# Patient Record
Sex: FEMALE | Race: WHITE | Employment: UNEMPLOYED | URBAN - METROPOLITAN AREA
[De-identification: names, ages, dates, MRNs, and addresses within clinical notes are randomized per-mention and may not be internally consistent; named-entity substitution may affect disease eponyms.]

---

## 2022-01-29 ENCOUNTER — APPOINTMENT (OUTPATIENT)
Dept: GENERAL RADIOLOGY | Age: 67
End: 2022-01-29
Attending: EMERGENCY MEDICINE
Payer: COMMERCIAL

## 2022-01-29 ENCOUNTER — HOSPITAL ENCOUNTER (EMERGENCY)
Age: 67
Discharge: HOME | End: 2022-01-29
Attending: EMERGENCY MEDICINE
Payer: COMMERCIAL

## 2022-01-29 VITALS
HEART RATE: 75 BPM | TEMPERATURE: 98.4 F | SYSTOLIC BLOOD PRESSURE: 131 MMHG | RESPIRATION RATE: 16 BRPM | DIASTOLIC BLOOD PRESSURE: 86 MMHG | OXYGEN SATURATION: 97 % | WEIGHT: 130.07 LBS

## 2022-01-29 DIAGNOSIS — K29.00 ACUTE GASTRITIS WITHOUT HEMORRHAGE, UNSPECIFIED GASTRITIS TYPE: ICD-10-CM

## 2022-01-29 DIAGNOSIS — F43.21 GRIEF REACTION: ICD-10-CM

## 2022-01-29 DIAGNOSIS — R07.9 CHEST PAIN, UNSPECIFIED TYPE: Primary | ICD-10-CM

## 2022-01-29 DIAGNOSIS — E87.6 HYPOKALEMIA: ICD-10-CM

## 2022-01-29 LAB
ALBUMIN SERPL-MCNC: 4.4 G/DL (ref 3.5–5)
ALBUMIN/GLOB SERPL: 1.3 {RATIO} (ref 1.1–2.2)
ALP SERPL-CCNC: 54 U/L (ref 45–117)
ALT SERPL-CCNC: 21 U/L (ref 12–78)
ANION GAP SERPL CALC-SCNC: 13 MMOL/L (ref 5–15)
AST SERPL-CCNC: 17 U/L (ref 15–37)
BASOPHILS # BLD: 0 K/UL (ref 0–0.1)
BASOPHILS NFR BLD: 1 % (ref 0–1)
BILIRUB SERPL-MCNC: 0.6 MG/DL (ref 0.2–1)
BUN SERPL-MCNC: 17 MG/DL (ref 6–20)
BUN/CREAT SERPL: 18 (ref 12–20)
CALCIUM SERPL-MCNC: 9.5 MG/DL (ref 8.5–10.1)
CHLORIDE SERPL-SCNC: 100 MMOL/L (ref 97–108)
CO2 SERPL-SCNC: 25 MMOL/L (ref 21–32)
CREAT SERPL-MCNC: 0.93 MG/DL (ref 0.55–1.02)
DIFFERENTIAL METHOD BLD: NORMAL
EOSINOPHIL # BLD: 0.1 K/UL (ref 0–0.4)
EOSINOPHIL NFR BLD: 1 % (ref 0–7)
ERYTHROCYTE [DISTWIDTH] IN BLOOD BY AUTOMATED COUNT: 11.9 % (ref 11.5–14.5)
GLOBULIN SER CALC-MCNC: 3.4 G/DL (ref 2–4)
GLUCOSE SERPL-MCNC: 122 MG/DL (ref 65–100)
HCT VFR BLD AUTO: 37.6 % (ref 35–47)
HGB BLD-MCNC: 13.2 G/DL (ref 11.5–16)
IMM GRANULOCYTES # BLD AUTO: 0 K/UL (ref 0–0.04)
IMM GRANULOCYTES NFR BLD AUTO: 0 % (ref 0–0.5)
LYMPHOCYTES # BLD: 1.9 K/UL (ref 0.8–3.5)
LYMPHOCYTES NFR BLD: 30 % (ref 12–49)
MCH RBC QN AUTO: 31 PG (ref 26–34)
MCHC RBC AUTO-ENTMCNC: 35.1 G/DL (ref 30–36.5)
MCV RBC AUTO: 88.3 FL (ref 80–99)
MONOCYTES # BLD: 0.5 K/UL (ref 0–1)
MONOCYTES NFR BLD: 8 % (ref 5–13)
NEUTS SEG # BLD: 3.8 K/UL (ref 1.8–8)
NEUTS SEG NFR BLD: 60 % (ref 32–75)
NRBC # BLD: 0 K/UL (ref 0–0.01)
NRBC BLD-RTO: 0 PER 100 WBC
PLATELET # BLD AUTO: 293 K/UL (ref 150–400)
PMV BLD AUTO: 9.8 FL (ref 8.9–12.9)
POTASSIUM SERPL-SCNC: 3.4 MMOL/L (ref 3.5–5.1)
PROT SERPL-MCNC: 7.8 G/DL (ref 6.4–8.2)
RBC # BLD AUTO: 4.26 M/UL (ref 3.8–5.2)
SODIUM SERPL-SCNC: 138 MMOL/L (ref 136–145)
TROPONIN-HIGH SENSITIVITY: 19 NG/L (ref 0–51)
TROPONIN-HIGH SENSITIVITY: 6 NG/L (ref 0–51)
WBC # BLD AUTO: 6.2 K/UL (ref 3.6–11)

## 2022-01-29 PROCEDURE — 84484 ASSAY OF TROPONIN QUANT: CPT

## 2022-01-29 PROCEDURE — 71046 X-RAY EXAM CHEST 2 VIEWS: CPT

## 2022-01-29 PROCEDURE — 74011250637 HC RX REV CODE- 250/637: Performed by: EMERGENCY MEDICINE

## 2022-01-29 PROCEDURE — 36415 COLL VENOUS BLD VENIPUNCTURE: CPT

## 2022-01-29 PROCEDURE — 93005 ELECTROCARDIOGRAM TRACING: CPT

## 2022-01-29 PROCEDURE — 85025 COMPLETE CBC W/AUTO DIFF WBC: CPT

## 2022-01-29 PROCEDURE — 80053 COMPREHEN METABOLIC PANEL: CPT

## 2022-01-29 PROCEDURE — 99285 EMERGENCY DEPT VISIT HI MDM: CPT

## 2022-01-29 RX ORDER — OMEPRAZOLE 40 MG/1
40 CAPSULE, DELAYED RELEASE ORAL DAILY
Qty: 40 CAPSULE | Refills: 0 | Status: SHIPPED | OUTPATIENT
Start: 2022-01-29

## 2022-01-29 RX ORDER — ASPIRIN 81 MG/1
81 TABLET ORAL DAILY
Qty: 30 TABLET | Refills: 0 | Status: SHIPPED | OUTPATIENT
Start: 2022-01-29

## 2022-01-29 RX ORDER — GUAIFENESIN 100 MG/5ML
162 LIQUID (ML) ORAL
Status: COMPLETED | OUTPATIENT
Start: 2022-01-29 | End: 2022-01-29

## 2022-01-29 RX ADMIN — ASPIRIN 81 MG CHEWABLE TABLET 162 MG: 81 TABLET CHEWABLE at 20:20

## 2022-01-29 NOTE — ED TRIAGE NOTES
Pt arrives with CP for a few days. Denies SOB. Some nausea. Pt has been under a lot of stress after recently as he lost her  last month. Pt took xanax and benadryl approx 5 hours ago and was able to sleep for a few hours.

## 2022-01-30 LAB
ATRIAL RATE: 82 BPM
CALCULATED P AXIS, ECG09: 52 DEGREES
CALCULATED R AXIS, ECG10: -57 DEGREES
CALCULATED T AXIS, ECG11: 57 DEGREES
DIAGNOSIS, 93000: NORMAL
P-R INTERVAL, ECG05: 160 MS
Q-T INTERVAL, ECG07: 376 MS
QRS DURATION, ECG06: 104 MS
QTC CALCULATION (BEZET), ECG08: 439 MS
VENTRICULAR RATE, ECG03: 82 BPM

## 2022-01-30 NOTE — DISCHARGE INSTRUCTIONS
Please follow closely with cardiology and your primary care doctor as discussed and return to the emergency department for any new or worsening symptoms. No strenuous activity until released by cardiology.

## 2022-01-30 NOTE — ED PROVIDER NOTES
Patient is a 70-year-old female with past medical history significant for melanoma and hiatal hernia who presents emergency department for evaluation of chest pain. She states she is actually been having intermittent episodes of chest pain for the past several weeks and saw her primary care doctor just over a week ago for the same. She states her cardiologist Dr. Bettie Coker is supposed to be setting her up for an echo stress test sometime soon however she is not sure its been set up yet. She states her primary care doctor was supposed to be looking into helping facilitate this as well. Patient denies any prior history of coronary artery disease. Denies any history of hypertension. Non-smoker. She states she has been under an immense amount of stress lately which she feels is likely playing a part in her symptoms. She states she found her  when he  unexpectedly about a month ago. She states there has not been a clear association with exertion and symptoms tend to start randomly. She also feels that her gastritis may be getting worse. She does take Pepcid at baseline. She states earlier today she felt anxiety was getting worse and so she took some Benadryl and a small dose of Xanax and took a long nap. She states when she woke up she started to have some chest discomfort which was about an hour and 1/2 to 2 hours prior to arrival to the ER. But symptoms are better at this time. Denies any peripheral swelling or calf pain. No history of PE or DVT. No syncope           Past Medical History:   Diagnosis Date    Melanoma [facility])        Past Surgical History:   Procedure Laterality Date    HX HEENT      dental implant         History reviewed. No pertinent family history.     Social History     Socioeconomic History    Marital status: SINGLE     Spouse name: Not on file    Number of children: Not on file    Years of education: Not on file    Highest education level: Not on file   Occupational History    Not on file   Tobacco Use    Smoking status: Never Smoker    Smokeless tobacco: Never Used   Substance and Sexual Activity    Alcohol use: Never    Drug use: Not on file    Sexual activity: Not on file   Other Topics Concern    Not on file   Social History Narrative    Not on file     Social Determinants of Health     Financial Resource Strain:     Difficulty of Paying Living Expenses: Not on file   Food Insecurity:     Worried About Running Out of Food in the Last Year: Not on file    Sherri of Food in the Last Year: Not on file   Transportation Needs:     Lack of Transportation (Medical): Not on file    Lack of Transportation (Non-Medical): Not on file   Physical Activity:     Days of Exercise per Week: Not on file    Minutes of Exercise per Session: Not on file   Stress:     Feeling of Stress : Not on file   Social Connections:     Frequency of Communication with Friends and Family: Not on file    Frequency of Social Gatherings with Friends and Family: Not on file    Attends Muslim Services: Not on file    Active Member of [address] [place] [address] or Organizations: Not on file    Attends Club or Organization Meetings: Not on file    Marital Status: Not on file   Intimate Partner Violence:     Fear of Current or Ex-Partner: Not on file    Emotionally Abused: Not on file    Physically Abused: Not on file    Sexually Abused: Not on file   Housing Stability:     Unable to Pay for Housing in the Last Year: Not on file    Number of [place] in the Last Year: Not on file    Unstable Housing in the Last Year: Not on file         ALLERGIES: Azithromycin    Review of Systems   Constitutional: Negative for fever. HENT: Negative for drooling. Respiratory: Negative for shortness of breath. Cardiovascular: Positive for chest pain. Negative for leg swelling. Gastrointestinal: Negative for blood in stool and vomiting. Musculoskeletal: Negative for neck pain. Skin: Negative for rash. Neurological: Negative for seizures and syncope. Hematological: Does not bruise/bleed easily. Psychiatric/Behavioral: Positive for sleep disturbance. Negative for confusion. Vitals:    01/29/22 2008 01/29/22 2023 01/29/22 2038 01/29/22 2053   BP: (!) 141/82 (!) 142/88 (!) 128/92 131/86   Pulse:       Resp:       Temp:       SpO2: 98% 97% 98% 97%   Weight:                Physical Exam  Vitals and nursing note reviewed. HENT:      Head: Normocephalic and atraumatic. Neck:      Trachea: No tracheal deviation. Cardiovascular:      Rate and Rhythm: Normal rate. Heart sounds: No murmur heard. No friction rub. Pulmonary:      Effort: Pulmonary effort is normal.      Breath sounds: Normal breath sounds. Chest:      Chest wall: No deformity or crepitus. Musculoskeletal:      Cervical back: Neck supple. Right lower leg: No tenderness. No edema. Left lower leg: No tenderness. No edema. Skin:     General: Skin is warm and dry. Neurological:      Mental Status: She is alert and oriented to person, place, and time. Psychiatric:         Mood and Affect: Mood normal.         Behavior: Behavior normal.          MDM  Number of Diagnoses or Management Options  Acute gastritis without hemorrhage, unspecified gastritis type  Chest pain, unspecified type  Grief reaction  Hypokalemia  Diagnosis management comments: Case discussed with on-call cardiologist.  Bedside screening ultrasound shows good squeeze and estimated to be normal EF at this time. No evidence of active heart failure. Patient under immense stress related to the grief recently losing her . On-call cardiologist to help facilitate setting up echo stress. Will increase medications for gastritis as this is likely contributing to patient's symptoms. Patient and daughter given strict return precautions. Advised of incidental finding of tortuous aorta noted on x-ray.        Amount and/or Complexity of Data Reviewed  Clinical lab tests: reviewed and ordered  Tests in the radiology section of CPT®: reviewed and ordered  Discuss the patient with other providers: yes  Independent visualization of images, tracings, or specimens: yes    Patient Progress  Patient progress: stable    ED Course as of 01/30/22 1616   Sat Jan 29, 2022 1958 EKG obtained at 1712 showing sinus rhythm, rate 82, incomplete right bundle, left axis deviation, no prior EKG available for comparison.  [JE]      ED Course User Index  [JE] Lewis Watkins MD       Procedures

## 2022-01-30 NOTE — ED NOTES
Pt d/c'd home. IV d/c'd, cath intact. Pt given d/c instructions and informed of e-scripts. Pt verbalized understanding.   Declined w/c and left ambulatory in care of self in stable condition

## 2022-11-29 ENCOUNTER — HOSPITAL ENCOUNTER (EMERGENCY)
Age: 67
Discharge: HOME | End: 2022-11-29
Attending: EMERGENCY MEDICINE
Payer: MEDICARE

## 2022-11-29 ENCOUNTER — APPOINTMENT (OUTPATIENT)
Dept: GENERAL RADIOLOGY | Age: 67
End: 2022-11-29
Attending: EMERGENCY MEDICINE
Payer: MEDICARE

## 2022-11-29 VITALS
DIASTOLIC BLOOD PRESSURE: 98 MMHG | RESPIRATION RATE: 8 BRPM | BODY MASS INDEX: 21.66 KG/M2 | HEIGHT: 65 IN | OXYGEN SATURATION: 99 % | SYSTOLIC BLOOD PRESSURE: 155 MMHG | WEIGHT: 130 LBS | HEART RATE: 75 BPM | TEMPERATURE: 98.3 F

## 2022-11-29 DIAGNOSIS — I10 HYPERTENSION, UNSPECIFIED TYPE: Primary | ICD-10-CM

## 2022-11-29 DIAGNOSIS — F41.8 ANXIETY ASSOCIATED WITH DEPRESSION: ICD-10-CM

## 2022-11-29 LAB
ALBUMIN SERPL-MCNC: 4.9 G/DL (ref 3.5–5)
ALBUMIN/GLOB SERPL: 1.4 {RATIO} (ref 1.1–2.2)
ALP SERPL-CCNC: 72 U/L (ref 45–117)
ALT SERPL-CCNC: 20 U/L (ref 12–78)
ANION GAP SERPL CALC-SCNC: 10 MMOL/L (ref 5–15)
AST SERPL-CCNC: 25 U/L (ref 15–37)
ATRIAL RATE: 81 BPM
BASOPHILS # BLD: 0 K/UL (ref 0–0.1)
BASOPHILS NFR BLD: 0 % (ref 0–1)
BILIRUB SERPL-MCNC: 0.7 MG/DL (ref 0.2–1)
BUN SERPL-MCNC: 14 MG/DL (ref 6–20)
BUN/CREAT SERPL: 18 (ref 12–20)
CALCIUM SERPL-MCNC: 9.4 MG/DL (ref 8.5–10.1)
CALCULATED P AXIS, ECG09: 51 DEGREES
CALCULATED R AXIS, ECG10: -47 DEGREES
CALCULATED T AXIS, ECG11: 42 DEGREES
CHLORIDE SERPL-SCNC: 100 MMOL/L (ref 97–108)
CO2 SERPL-SCNC: 28 MMOL/L (ref 21–32)
CREAT SERPL-MCNC: 0.8 MG/DL (ref 0.55–1.02)
DIAGNOSIS, 93000: NORMAL
DIFFERENTIAL METHOD BLD: ABNORMAL
EOSINOPHIL # BLD: 0 K/UL (ref 0–0.4)
EOSINOPHIL NFR BLD: 0 % (ref 0–7)
ERYTHROCYTE [DISTWIDTH] IN BLOOD BY AUTOMATED COUNT: 11.9 % (ref 11.5–14.5)
GLOBULIN SER CALC-MCNC: 3.6 G/DL (ref 2–4)
GLUCOSE SERPL-MCNC: 110 MG/DL (ref 65–100)
HCT VFR BLD AUTO: 40 % (ref 35–47)
HGB BLD-MCNC: 14 G/DL (ref 11.5–16)
IMM GRANULOCYTES # BLD AUTO: 0 K/UL (ref 0–0.04)
IMM GRANULOCYTES NFR BLD AUTO: 0 % (ref 0–0.5)
LYMPHOCYTES # BLD: 1.2 K/UL (ref 0.8–3.5)
LYMPHOCYTES NFR BLD: 18 % (ref 12–49)
MCH RBC QN AUTO: 31.3 PG (ref 26–34)
MCHC RBC AUTO-ENTMCNC: 35 G/DL (ref 30–36.5)
MCV RBC AUTO: 89.5 FL (ref 80–99)
MONOCYTES # BLD: 0.4 K/UL (ref 0–1)
MONOCYTES NFR BLD: 6 % (ref 5–13)
NEUTS SEG # BLD: 5 K/UL (ref 1.8–8)
NEUTS SEG NFR BLD: 76 % (ref 32–75)
NRBC # BLD: 0 K/UL (ref 0–0.01)
NRBC BLD-RTO: 0 PER 100 WBC
P-R INTERVAL, ECG05: 158 MS
PLATELET # BLD AUTO: 259 K/UL (ref 150–400)
PMV BLD AUTO: 10 FL (ref 8.9–12.9)
POTASSIUM SERPL-SCNC: 3.6 MMOL/L (ref 3.5–5.1)
PROT SERPL-MCNC: 8.5 G/DL (ref 6.4–8.2)
Q-T INTERVAL, ECG07: 394 MS
QRS DURATION, ECG06: 94 MS
QTC CALCULATION (BEZET), ECG08: 457 MS
RBC # BLD AUTO: 4.47 M/UL (ref 3.8–5.2)
SODIUM SERPL-SCNC: 138 MMOL/L (ref 136–145)
TROPONIN-HIGH SENSITIVITY: 4 NG/L (ref 0–51)
VENTRICULAR RATE, ECG03: 81 BPM
WBC # BLD AUTO: 6.7 K/UL (ref 3.6–11)

## 2022-11-29 PROCEDURE — 80053 COMPREHEN METABOLIC PANEL: CPT

## 2022-11-29 PROCEDURE — 85025 COMPLETE CBC W/AUTO DIFF WBC: CPT

## 2022-11-29 PROCEDURE — 93005 ELECTROCARDIOGRAM TRACING: CPT

## 2022-11-29 PROCEDURE — 99285 EMERGENCY DEPT VISIT HI MDM: CPT

## 2022-11-29 PROCEDURE — 84484 ASSAY OF TROPONIN QUANT: CPT

## 2022-11-29 PROCEDURE — 71046 X-RAY EXAM CHEST 2 VIEWS: CPT

## 2022-11-29 PROCEDURE — 36415 COLL VENOUS BLD VENIPUNCTURE: CPT

## 2022-11-29 RX ORDER — METOPROLOL SUCCINATE 25 MG/1
TABLET, EXTENDED RELEASE ORAL DAILY
COMMUNITY

## 2022-11-29 RX ORDER — ALPRAZOLAM 0.25 MG/1
TABLET ORAL
COMMUNITY
Start: 2022-10-07

## 2022-11-29 NOTE — ED TRIAGE NOTES
Pt reports last week she decided she only wanted to take half a dose of her metoprolol because she thinks she didn't need it. Pt reports her pressure today she noticed her BP was higher than normal and she was having \"pressure in her chest and she feels shaky inside and she is anxious and is sometimes having heart palpitations. \" Pt denies SOB.

## 2022-11-29 NOTE — ED PROVIDER NOTES
59-year-old female with history of hypertension, anxiety presents with complaints of elevated blood pressure and increased anxiety today. Patient reports she only took half of her blood pressure medication, metoprolol, for the past 2 weeks because she did not think that she needed more. Patient became very anxious today when she noticed her blood pressure was elevated with a systolic in the 101C. Patient reports she feels much improved now. Denies any recent illness, fever, chills, nausea, vomiting, diarrhea, constipation, shortness of breath. No other complaints. Denies tobacco use, drug use, alcohol use  Primary care-Nataliya       Past Medical History:   Diagnosis Date    HTN (hypertension)     Melanoma ([facility] [address]        Past Surgical History:   Procedure Laterality Date    HX HEENT      dental implant         History reviewed. No pertinent family history. Social History     Socioeconomic History    Marital status:      Spouse name: Not on file    Number of children: Not on file    Years of education: Not on file    Highest education level: Not on file   Occupational History    Not on file   Tobacco Use    Smoking status: Never    Smokeless tobacco: Never   Substance and Sexual Activity    Alcohol use: Never    Drug use: Never    Sexual activity: Not on file   Other Topics Concern    Not on file   Social History Narrative    Not on file     Social Determinants of Health     Financial Resource Strain: Not on file   Food Insecurity: Not on file   Transportation Needs: Not on file   Physical Activity: Not on file   Stress: Not on file   Social Connections: Not on file   Intimate Partner Violence: Not on file   Housing Stability: Not on file         ALLERGIES: Azithromycin    Review of Systems   Constitutional:  Negative for chills and fever. HENT:  Negative for congestion, nosebleeds and rhinorrhea. Eyes:  Negative for pain and redness. Respiratory:  Negative for cough and shortness of breath. Cardiovascular:  Positive for chest pain. Negative for palpitations. Gastrointestinal:  Negative for abdominal pain, nausea and vomiting. Genitourinary:  Negative for dysuria, frequency, vaginal bleeding and vaginal pain. Musculoskeletal:  Negative for myalgias. Skin:  Negative for rash and wound. Neurological:  Negative for seizures, syncope and weakness. Hematological:  Does not bruise/bleed easily. Psychiatric/Behavioral:  Negative for agitation, confusion, dysphoric mood and suicidal ideas. The patient is nervous/anxious. Vitals:    11/29/22 1250 11/29/22 1525 11/29/22 1530 11/29/22 1535   BP: (!) 157/79 (!) 173/98 (!) 167/109    Pulse: 89  84    Resp: 16      Temp: 98.3 °F (36.8 °C)      SpO2: 100%   100%   Weight: 59 kg (130 lb)      Height: 5' 5\" (1.651 m)               Physical Exam  Vitals and nursing note reviewed. Constitutional:       Appearance: She is well-developed. HENT:      Head: Normocephalic and atraumatic. Eyes:      Pupils: Pupils are equal, round, and reactive to light. Neck:      Trachea: No tracheal deviation. Cardiovascular:      Rate and Rhythm: Normal rate and regular rhythm. Heart sounds: Normal heart sounds. Pulmonary:      Effort: Pulmonary effort is normal. No respiratory distress. Breath sounds: Normal breath sounds. No stridor. No wheezing or rales. Chest:      Chest wall: No tenderness. Abdominal:      General: Bowel sounds are normal. There is no distension. Palpations: Abdomen is soft. Tenderness: There is no abdominal tenderness. There is no rebound. Musculoskeletal:         General: No tenderness. Normal range of motion. Cervical back: Normal range of motion and neck supple. Skin:     General: Skin is warm and dry. Coloration: Skin is not pale. Findings: No rash. Neurological:      Mental Status: She is alert and oriented to person, place, and time. Cranial Nerves: No cranial nerve deficit. MDM  Number of Diagnoses or Management Options  Anxiety associated with depression  Hypertension, unspecified type  Diagnosis management comments: 21-year-old female with history of hypertension, anxiety presents with increased anxiety and chest pressure after noticing her blood pressure was elevated. Patient is well-appearing, no acute distress, hemodynamically stable, afebrile, nontoxic, no respiratory distress, clear to auscultation bilaterally, normal room oxygen saturation. Plan-EKG, chest x-ray, CBC/CMP/cardiac enzymes. Labs and x-ray unremarkable      ED Course as of 11/29/22 1606   Tue Nov 29, 2022   1327 ED EKG interpretation:  Rhythm: normal sinus rhythm; and regular . Rate (approx.): 81; Axis: left axis deviation; P wave: normal; QRS interval: normal ; ST/T wave: normal; Other findings: no ischemia. This EKG was interpreted by Cristina Hardy MD,ED Provider.   [LA]      ED Course User Index  [LA] Gabe López MD       Procedures      4:11 PM  Patient's results have been reviewed with them. Patient and/or family have verbally conveyed their understanding and agreement of the patient's signs, symptoms, diagnosis, treatment and prognosis and additionally agree to follow up as recommended or return to the Emergency Room should their condition change prior to follow-up. Discharge instructions have also been provided to the patient with some educational information regarding their diagnosis as well a list of reasons why they would want to return to the ER prior to their follow-up appointment should their condition change.

## 2023-03-23 ENCOUNTER — APPOINTMENT (OUTPATIENT)
Dept: CT IMAGING | Age: 68
End: 2023-03-23
Attending: EMERGENCY MEDICINE
Payer: MEDICARE

## 2023-03-23 ENCOUNTER — HOSPITAL ENCOUNTER (EMERGENCY)
Age: 68
Discharge: HOME | End: 2023-03-23
Attending: EMERGENCY MEDICINE
Payer: MEDICARE

## 2023-03-23 VITALS
HEART RATE: 73 BPM | SYSTOLIC BLOOD PRESSURE: 134 MMHG | BODY MASS INDEX: 22.63 KG/M2 | OXYGEN SATURATION: 98 % | DIASTOLIC BLOOD PRESSURE: 84 MMHG | TEMPERATURE: 97.4 F | HEIGHT: 65 IN | WEIGHT: 135.8 LBS | RESPIRATION RATE: 13 BRPM

## 2023-03-23 DIAGNOSIS — R42 DIZZINESS: Primary | ICD-10-CM

## 2023-03-23 LAB
ALBUMIN SERPL-MCNC: 4.5 G/DL (ref 3.5–5)
ALBUMIN/GLOB SERPL: 1.5 (ref 1.1–2.2)
ALP SERPL-CCNC: 65 U/L (ref 45–117)
ALT SERPL-CCNC: 15 U/L (ref 12–78)
ANION GAP SERPL CALC-SCNC: 15 MMOL/L (ref 5–15)
AST SERPL-CCNC: 30 U/L (ref 15–37)
ATRIAL RATE: 70 BPM
BASOPHILS # BLD: 0 K/UL (ref 0–0.1)
BASOPHILS NFR BLD: 0 % (ref 0–1)
BILIRUB SERPL-MCNC: 0.7 MG/DL (ref 0.2–1)
BUN SERPL-MCNC: 18 MG/DL (ref 6–20)
BUN/CREAT SERPL: 26 (ref 12–20)
CALCIUM SERPL-MCNC: 9.1 MG/DL (ref 8.5–10.1)
CALCULATED P AXIS, ECG09: 61 DEGREES
CALCULATED R AXIS, ECG10: -52 DEGREES
CALCULATED T AXIS, ECG11: 43 DEGREES
CHLORIDE SERPL-SCNC: 97 MMOL/L (ref 97–108)
CO2 SERPL-SCNC: 22 MMOL/L (ref 21–32)
CREAT SERPL-MCNC: 0.7 MG/DL (ref 0.55–1.02)
DIAGNOSIS, 93000: NORMAL
DIFFERENTIAL METHOD BLD: ABNORMAL
EOSINOPHIL # BLD: 0 K/UL (ref 0–0.4)
EOSINOPHIL NFR BLD: 0 % (ref 0–7)
ERYTHROCYTE [DISTWIDTH] IN BLOOD BY AUTOMATED COUNT: 11.8 % (ref 11.5–14.5)
GLOBULIN SER CALC-MCNC: 3.1 G/DL (ref 2–4)
GLUCOSE SERPL-MCNC: 165 MG/DL (ref 65–100)
HCT VFR BLD AUTO: 38.5 % (ref 35–47)
HGB BLD-MCNC: 13.5 G/DL (ref 11.5–16)
IMM GRANULOCYTES # BLD AUTO: 0.1 K/UL (ref 0–0.04)
IMM GRANULOCYTES NFR BLD AUTO: 1 % (ref 0–0.5)
LIPASE SERPL-CCNC: 114 U/L (ref 73–393)
LYMPHOCYTES # BLD: 1.4 K/UL (ref 0.8–3.5)
LYMPHOCYTES NFR BLD: 18 % (ref 12–49)
MCH RBC QN AUTO: 31 PG (ref 26–34)
MCHC RBC AUTO-ENTMCNC: 35.1 G/DL (ref 30–36.5)
MCV RBC AUTO: 88.3 FL (ref 80–99)
MONOCYTES # BLD: 0.5 K/UL (ref 0–1)
MONOCYTES NFR BLD: 7 % (ref 5–13)
NEUTS SEG # BLD: 5.6 K/UL (ref 1.8–8)
NEUTS SEG NFR BLD: 74 % (ref 32–75)
NRBC # BLD: 0 K/UL (ref 0–0.01)
NRBC BLD-RTO: 0 PER 100 WBC
P-R INTERVAL, ECG05: 176 MS
PLATELET # BLD AUTO: 269 K/UL (ref 150–400)
PMV BLD AUTO: 10.6 FL (ref 8.9–12.9)
POTASSIUM SERPL-SCNC: 3.9 MMOL/L (ref 3.5–5.1)
PROT SERPL-MCNC: 7.6 G/DL (ref 6.4–8.2)
Q-T INTERVAL, ECG07: 440 MS
QRS DURATION, ECG06: 110 MS
QTC CALCULATION (BEZET), ECG08: 475 MS
RBC # BLD AUTO: 4.36 M/UL (ref 3.8–5.2)
SODIUM SERPL-SCNC: 134 MMOL/L (ref 136–145)
VENTRICULAR RATE, ECG03: 70 BPM
WBC # BLD AUTO: 7.7 K/UL (ref 3.6–11)

## 2023-03-23 PROCEDURE — 36415 COLL VENOUS BLD VENIPUNCTURE: CPT

## 2023-03-23 PROCEDURE — 74011250637 HC RX REV CODE- 250/637: Performed by: EMERGENCY MEDICINE

## 2023-03-23 PROCEDURE — 96374 THER/PROPH/DIAG INJ IV PUSH: CPT

## 2023-03-23 PROCEDURE — 85025 COMPLETE CBC W/AUTO DIFF WBC: CPT

## 2023-03-23 PROCEDURE — 74011250636 HC RX REV CODE- 250/636: Performed by: EMERGENCY MEDICINE

## 2023-03-23 PROCEDURE — 96376 TX/PRO/DX INJ SAME DRUG ADON: CPT

## 2023-03-23 PROCEDURE — 99284 EMERGENCY DEPT VISIT MOD MDM: CPT

## 2023-03-23 PROCEDURE — 80053 COMPREHEN METABOLIC PANEL: CPT

## 2023-03-23 PROCEDURE — 83690 ASSAY OF LIPASE: CPT

## 2023-03-23 PROCEDURE — 70450 CT HEAD/BRAIN W/O DYE: CPT

## 2023-03-23 RX ORDER — MECLIZINE HYDROCHLORIDE 25 MG/1
25 TABLET ORAL
Qty: 20 TABLET | Refills: 0 | Status: SHIPPED | OUTPATIENT
Start: 2023-03-23

## 2023-03-23 RX ORDER — ONDANSETRON 2 MG/ML
4 INJECTION INTRAMUSCULAR; INTRAVENOUS ONCE
Status: COMPLETED | OUTPATIENT
Start: 2023-03-23 | End: 2023-03-23

## 2023-03-23 RX ORDER — MECLIZINE HCL 12.5 MG 12.5 MG/1
25 TABLET ORAL
Status: COMPLETED | OUTPATIENT
Start: 2023-03-23 | End: 2023-03-23

## 2023-03-23 RX ORDER — MECLIZINE HCL 12.5 MG 12.5 MG/1
25 TABLET ORAL 3 TIMES DAILY
Status: DISCONTINUED | OUTPATIENT
Start: 2023-03-23 | End: 2023-03-23

## 2023-03-23 RX ORDER — DIAZEPAM 5 MG/1
5 TABLET ORAL
Status: COMPLETED | OUTPATIENT
Start: 2023-03-23 | End: 2023-03-23

## 2023-03-23 RX ORDER — ONDANSETRON 4 MG/1
4 TABLET, ORALLY DISINTEGRATING ORAL
Qty: 10 TABLET | Refills: 0 | Status: SHIPPED | OUTPATIENT
Start: 2023-03-23

## 2023-03-23 RX ADMIN — MECLIZINE 25 MG: 12.5 TABLET ORAL at 13:04

## 2023-03-23 RX ADMIN — SODIUM CHLORIDE 1000 ML: 9 INJECTION, SOLUTION INTRAVENOUS at 10:58

## 2023-03-23 RX ADMIN — ONDANSETRON 4 MG: 2 INJECTION INTRAMUSCULAR; INTRAVENOUS at 12:47

## 2023-03-23 RX ADMIN — DIAZEPAM 5 MG: 5 TABLET ORAL at 11:45

## 2023-03-23 RX ADMIN — ONDANSETRON 4 MG: 2 INJECTION INTRAMUSCULAR; INTRAVENOUS at 10:58

## 2023-03-23 NOTE — ED TRIAGE NOTES
Patient arrives to ER via EMS stretcher actively vomiting. Patient states around 0900 this morning that she became dizzy, feeling like the room is spinning, with nausea and vomiting. Patient also currently on antibiotics for an inner ear infection that she started Tuesday evening. Patient emesis on arrival noted to be bright red emesis on arrival states she had red berries for breakfast this morning.

## 2023-03-23 NOTE — ED NOTES
Patient able to open eyes and readjust herself in bed. States at this time dizziness has improved some.

## 2023-03-25 NOTE — ED PROVIDER NOTES
72-year-old female with nausea and vomiting with dizziness. Currently on antibiotics for middle ear infection. No chest pain or shortness of breath. No fevers or chills. The history is provided by the patient. Dizziness  This is a new problem. The current episode started 1 to 2 hours ago. The problem has not changed since onset. There was no focality noted. Pertinent negatives include no focal weakness, no slurred speech, no speech difficulty, no memory loss, no movement disorder, no agitation, no visual change, no mental status change, no unresponsiveness and no disorientation. There has been no fever. Pertinent negatives include no shortness of breath, no chest pain, no vomiting, no altered mental status, no confusion, no headaches, no nausea, no bowel incontinence and no bladder incontinence. Associated medical issues do not include trauma, mood changes, bleeding disorder, seizures, dementia, CVA or clotting disorder. Past Medical History:   Diagnosis Date    HTN (hypertension)     Melanoma ([facility] [address]        Past Surgical History:   Procedure Laterality Date    HX HEENT      dental implant         History reviewed. No pertinent family history.     Social History     Socioeconomic History    Marital status:      Spouse name: Not on file    Number of children: Not on file    Years of education: Not on file    Highest education level: Not on file   Occupational History    Not on file   Tobacco Use    Smoking status: Never    Smokeless tobacco: Never   Substance and Sexual Activity    Alcohol use: Never    Drug use: Never    Sexual activity: Not on file   Other Topics Concern    Not on file   Social History Narrative    Not on file     Social Determinants of Health     Financial Resource Strain: Not on file   Food Insecurity: Not on file   Transportation Needs: Not on file   Physical Activity: Not on file   Stress: Not on file   Social Connections: Not on file   Intimate Partner Violence: Not on file Housing Stability: Not on file         ALLERGIES: Azithromycin    Review of Systems   Constitutional:  Negative for chills and fever. HENT:  Negative for congestion, rhinorrhea, sneezing and sore throat. Respiratory:  Negative for shortness of breath. Cardiovascular:  Negative for chest pain. Gastrointestinal:  Negative for abdominal pain, bowel incontinence, nausea and vomiting. Genitourinary:  Negative for bladder incontinence. Musculoskeletal:  Negative for back pain, myalgias and neck stiffness. Skin:  Negative for rash. Neurological:  Positive for dizziness. Negative for focal weakness, speech difficulty, weakness and headaches. Psychiatric/Behavioral:  Negative for agitation, confusion and memory loss. All other systems reviewed and are negative. Vitals:    03/23/23 1305 03/23/23 1330 03/23/23 1340 03/23/23 1400   BP: 134/82 124/74  134/84   Pulse: 89 72 73    Resp: 18 11 13    Temp:       SpO2: 98% 97% 98%    Weight:       Height:                Physical Exam  Vitals and nursing note reviewed. Constitutional:       Appearance: Normal appearance. She is well-developed. HENT:      Head: Normocephalic and atraumatic. Eyes:      Conjunctiva/sclera: Conjunctivae normal.   Cardiovascular:      Rate and Rhythm: Normal rate and regular rhythm. Pulses: Normal pulses. Heart sounds: Normal heart sounds, S1 normal and S2 normal.   Pulmonary:      Effort: Pulmonary effort is normal. No respiratory distress. Breath sounds: Normal breath sounds. No wheezing. Abdominal:      General: Bowel sounds are normal. There is no distension. Palpations: Abdomen is soft. Tenderness: There is no abdominal tenderness. There is no rebound. Musculoskeletal:         General: Normal range of motion. Cervical back: Full passive range of motion without pain, normal range of motion and neck supple. Skin:     General: Skin is warm and dry. Findings: No rash. Neurological:      Mental Status: She is alert and oriented to person, place, and time. Psychiatric:         Speech: Speech normal.         Behavior: Behavior normal.         Thought Content: Thought content normal.         Judgment: Judgment normal.        Medical Decision Making  Dizziness nausea and vomiting earlier today. Differential includes vertigo versus gastroenteritis versus GE reflux          Amount and/or Complexity of Data Reviewed  Labs: ordered. Radiology: ordered. ECG/medicine tests: ordered. Risk  Prescription drug management. ED Course as of 03/25/23 0148   Thu Mar 23, 2023   1118 EKG at 1041 1045 shows normal sinus rhythm 70 bpm no ST elevations or ectopy. [VM]      ED Course User Index  [VM] Adelia Harman MD      Latest Reference Range & Units 3/23/23 10:42   WBC 3.6 - 11.0 K/uL 7.7   NRBC 0  WBC 0.0   RBC 3.80 - 5.20 M/uL 4.36   HGB 11.5 - 16.0 g/dL 13.5   HCT 35.0 - 47.0 % 38.5   MCV 80.0 - 99.0 FL 88.3   MCH 26.0 - 34.0 PG 31.0   MCHC 30.0 - 36.5 g/dL 35.1   RDW 11.5 - 14.5 % 11.8   PLATELET 053 - 539 K/uL 269   MPV 8.9 - 12.9 FL 10.6   NEUTROPHILS 32 - 75 % 74   LYMPHOCYTES 12 - 49 % 18   MONOCYTES 5 - 13 % 7   EOSINOPHILS 0 - 7 % 0   BASOPHILS 0 - 1 % 0   IMMATURE GRANULOCYTES 0.0 - 0.5 % 1 (H)   DF -   AUTOMATED   ABSOLUTE NRBC 0.00 - 0.01 K/uL 0.00   ABS. NEUTROPHILS 1.8 - 8.0 K/UL 5.6   ABS. IMM. GRANS. 0.00 - 0.04 K/UL 0.1 (H)   ABS. LYMPHOCYTES 0.8 - 3.5 K/UL 1.4   ABS. MONOCYTES 0.0 - 1.0 K/UL 0.5   ABS. EOSINOPHILS 0.0 - 0.4 K/UL 0.0   ABS.  BASOPHILS 0.0 - 0.1 K/UL 0.0   Sodium 136 - 145 mmol/L 134 (L)   Potassium 3.5 - 5.1 mmol/L 3.9   Chloride 97 - 108 mmol/L 97   CO2 21 - 32 mmol/L 22   Anion gap 5 - 15 mmol/L 15   Glucose 65 - 100 mg/dL 165 (H)   BUN 6 - 20 MG/DL 18   Creatinine 0.55 - 1.02 MG/DL 0.70   BUN/Creatinine ratio 12 - 20   26 (H)   Calcium 8.5 - 10.1 MG/DL 9.1   eGFR >60 ml/min/1.73m2 >60   Bilirubin, total 0.2 - 1.0 MG/DL 0.7   Protein, total 6.4 - 8.2 g/dL 7.6   Albumin 3.5 - 5.0 g/dL 4.5   Globulin 2.0 - 4.0 g/dL 3.1   A-G Ratio 1.1 - 2.2   1.5   ALT 12 - 78 U/L 15   AST 15 - 37 U/L 30   Alk. phosphatase 45 - 117 U/L 65   Lipase 73 - 393 U/L 114   (H): Data is abnormally high  (L): Data is abnormally low    CT HEAD WO CONT   Final Result   No acute intracranial hemorrhage, mass or infarct.                Procedures

## 2023-03-27 LAB
ATRIAL RATE: 70 BPM
CALCULATED P AXIS, ECG09: 61 DEGREES
CALCULATED R AXIS, ECG10: -52 DEGREES
CALCULATED T AXIS, ECG11: 43 DEGREES
DIAGNOSIS, 93000: NORMAL
P-R INTERVAL, ECG05: 176 MS
Q-T INTERVAL, ECG07: 440 MS
QRS DURATION, ECG06: 110 MS
QTC CALCULATION (BEZET), ECG08: 475 MS
VENTRICULAR RATE, ECG03: 70 BPM